# Patient Record
Sex: MALE | Race: WHITE | URBAN - METROPOLITAN AREA
[De-identification: names, ages, dates, MRNs, and addresses within clinical notes are randomized per-mention and may not be internally consistent; named-entity substitution may affect disease eponyms.]

---

## 2017-06-26 ENCOUNTER — PRE VISIT (OUTPATIENT)
Dept: ORTHOPEDICS | Facility: CLINIC | Age: 62
End: 2017-06-26

## 2017-06-26 NOTE — TELEPHONE ENCOUNTER
1.  Date/reason for appt: 9/14/17 9:30AM - Chronic spondey and herniated disc  2.  Referring provider: self-referred  3.  Call to patient (Yes / No - short description): Yes - pt's wife Kaylen transferred from Call Center  4.  Previous care at / records requested from:    - [facility] -- NEED REY[facility]   - [facility] -- NEED RE[facility]   - [facility] -- NEED [facility]   - [facility]: Imaging was done here a few years ago, will be completing new imaging. Will wait to request imaging until new ones are completed.      **Mailed [facility] forms to pt's home address. Per pt's wife, pt has NO Hx of spinal surgeries.

## 2017-06-27 NOTE — TELEPHONE ENCOUNTER
Spoke to Meg at [facility] -- she will push Lumbar 6/26/17 to  [facility]. The only other imaging they have is MRI L Spine 10/26/10 (already in [facility]). The report for L Spine 6/26/17 is not completed yet, she will fax the report for MRI L Spine 10/26/10.

## 2017-06-27 NOTE — TELEPHONE ENCOUNTER
Spoke to pt's wife Kaylen. She would like the [facility]'s to be emailed to them instead -- emailed to [email or web address].    Pt completed new imaging last night at [facility], will contact [facility] for imaging and reports.

## 2017-06-28 NOTE — TELEPHONE ENCOUNTER
Records received from [facility].   Included  PT notes: 2010, 2009, 2008, 2005, 2004

## 2017-07-05 NOTE — TELEPHONE ENCOUNTER
Records received from [facility].   Included  Office notes: 6/25/17, 6/26/17  Radiology reports: MR lumbar on 6/27/17

## 2017-07-12 NOTE — TELEPHONE ENCOUNTER
Per  [facility] - they have no spine imaging in their system for pt.